# Patient Record
Sex: FEMALE | Race: WHITE | NOT HISPANIC OR LATINO
[De-identification: names, ages, dates, MRNs, and addresses within clinical notes are randomized per-mention and may not be internally consistent; named-entity substitution may affect disease eponyms.]

---

## 2018-07-18 ENCOUNTER — HOSPITAL (OUTPATIENT)
Dept: OTHER | Age: 33
End: 2018-07-18
Attending: PHYSICIAN ASSISTANT

## 2018-08-04 ENCOUNTER — HOSPITAL (OUTPATIENT)
Dept: OTHER | Age: 33
End: 2018-08-04
Attending: PHYSICIAN ASSISTANT

## 2018-08-23 ENCOUNTER — LAB SERVICES (OUTPATIENT)
Dept: OTHER | Age: 33
End: 2018-08-23

## 2018-08-23 ENCOUNTER — CHARTING TRANS (OUTPATIENT)
Dept: OTHER | Age: 33
End: 2018-08-23

## 2018-09-12 ENCOUNTER — HOSPITAL (OUTPATIENT)
Dept: OTHER | Age: 33
End: 2018-09-12
Attending: FAMILY MEDICINE

## 2018-09-21 ENCOUNTER — CHARTING TRANS (OUTPATIENT)
Dept: OTHER | Age: 33
End: 2018-09-21

## 2018-09-24 LAB
T3FREE SERPL-MCNC: >20 PG/ML (ref 2.2–4)
T4 FREE SERPL-MCNC: >8 NG/DL (ref 0.8–1.5)
THYROGLOB AB SERPL-ACNC: <0.9 IU/ML (ref 0–4)
THYROPEROXIDASE AB SERPL-ACNC: 4924 UNITS/ML
TSH SERPL-ACNC: <0.008 MCUNITS/ML (ref 0.35–5)

## 2018-09-27 ENCOUNTER — HOSPITAL (OUTPATIENT)
Dept: OTHER | Age: 33
End: 2018-09-27
Attending: FAMILY MEDICINE

## 2018-10-08 ENCOUNTER — HOSPITAL (OUTPATIENT)
Dept: OTHER | Age: 33
End: 2018-10-08
Attending: EMERGENCY MEDICINE

## 2018-10-23 ENCOUNTER — HOSPITAL (OUTPATIENT)
Dept: OTHER | Age: 33
End: 2018-10-23
Attending: PHYSICIAN ASSISTANT

## 2018-10-23 LAB
CONTROL LINE: PRESENT
INFLU A POC: NORMAL
INFLU B POC: NORMAL
Lab: CLEAR

## 2018-12-08 VITALS
BODY MASS INDEX: 29.64 KG/M2 | SYSTOLIC BLOOD PRESSURE: 110 MMHG | HEART RATE: 78 BPM | WEIGHT: 161.06 LBS | TEMPERATURE: 98.3 F | DIASTOLIC BLOOD PRESSURE: 62 MMHG | HEIGHT: 62 IN | RESPIRATION RATE: 16 BRPM

## 2018-12-08 VITALS
TEMPERATURE: 97.6 F | DIASTOLIC BLOOD PRESSURE: 80 MMHG | RESPIRATION RATE: 16 BRPM | WEIGHT: 163 LBS | BODY MASS INDEX: 30 KG/M2 | SYSTOLIC BLOOD PRESSURE: 110 MMHG | HEIGHT: 62 IN | HEART RATE: 72 BPM

## 2019-01-02 RX ORDER — ALBUTEROL SULFATE 90 UG/1
2 AEROSOL, METERED RESPIRATORY (INHALATION) EVERY 4 HOURS PRN
COMMUNITY

## 2019-01-02 RX ORDER — METHIMAZOLE 10 MG/1
10 TABLET ORAL DAILY
COMMUNITY
End: 2023-04-12 | Stop reason: ALTCHOICE

## 2019-01-02 RX ORDER — PROPRANOLOL HYDROCHLORIDE 20 MG/1
20 TABLET ORAL 2 TIMES DAILY
COMMUNITY
End: 2023-04-12 | Stop reason: ALTCHOICE

## 2019-01-02 RX ORDER — CLONAZEPAM 0.5 MG/1
0.5 TABLET ORAL 3 TIMES DAILY PRN
COMMUNITY
End: 2023-04-12 | Stop reason: ALTCHOICE

## 2019-01-08 ENCOUNTER — TELEPHONE (OUTPATIENT)
Dept: OBGYN | Age: 34
End: 2019-01-08

## 2019-01-10 ENCOUNTER — OFFICE VISIT (OUTPATIENT)
Dept: FAMILY MEDICINE | Age: 34
End: 2019-01-10

## 2019-01-10 VITALS
DIASTOLIC BLOOD PRESSURE: 66 MMHG | HEIGHT: 62 IN | HEART RATE: 64 BPM | WEIGHT: 163 LBS | RESPIRATION RATE: 18 BRPM | BODY MASS INDEX: 30 KG/M2 | SYSTOLIC BLOOD PRESSURE: 122 MMHG

## 2019-01-10 DIAGNOSIS — J06.9 VIRAL URI: ICD-10-CM

## 2019-01-10 DIAGNOSIS — Z72.0 TOBACCO ABUSE: ICD-10-CM

## 2019-01-10 DIAGNOSIS — O09.91 HIGH-RISK PREGNANCY IN FIRST TRIMESTER: Primary | ICD-10-CM

## 2019-01-10 DIAGNOSIS — E07.9 THYROID DISEASE: ICD-10-CM

## 2019-01-10 PROBLEM — E05.00 GRAVES DISEASE: Status: ACTIVE | Noted: 2019-01-10

## 2019-01-10 PROBLEM — T78.40XA ALLERGY: Status: ACTIVE | Noted: 2019-01-10

## 2019-01-10 PROBLEM — K08.409 WISDOM TEETH EXTRACTED: Status: RESOLVED | Noted: 2019-01-10 | Resolved: 2019-01-10

## 2019-01-10 PROCEDURE — 99204 OFFICE O/P NEW MOD 45 MIN: CPT | Performed by: NURSE PRACTITIONER

## 2019-01-10 RX ORDER — PROPYLTHIOURACIL 50 MG/1
50 TABLET ORAL 3 TIMES DAILY
COMMUNITY
End: 2023-04-12 | Stop reason: ALTCHOICE

## 2019-01-10 ASSESSMENT — ENCOUNTER SYMPTOMS
CHILLS: 0
ALLERGIC/IMMUNOLOGIC NEGATIVE: 1
PSYCHIATRIC NEGATIVE: 1
EYES NEGATIVE: 1
HEMATOLOGIC/LYMPHATIC NEGATIVE: 1
NEUROLOGICAL NEGATIVE: 1
GASTROINTESTINAL NEGATIVE: 1
ENDOCRINE NEGATIVE: 1
EYE DISCHARGE: 0
RESPIRATORY NEGATIVE: 1
RHINORRHEA: 1

## 2019-01-17 ENCOUNTER — TELEPHONE (OUTPATIENT)
Dept: OBGYN | Age: 34
End: 2019-01-17

## 2019-01-28 ENCOUNTER — HOSPITAL (OUTPATIENT)
Dept: OTHER | Age: 34
End: 2019-01-28
Attending: NURSE PRACTITIONER

## 2019-02-04 ENCOUNTER — TELEPHONE (OUTPATIENT)
Dept: OBGYN CLINIC | Facility: CLINIC | Age: 34
End: 2019-02-04

## 2019-02-04 NOTE — TELEPHONE ENCOUNTER
New pt. Confirms LMP 11/23/18 and positive HTP. 10w3d confirms regular cycles every 28 days. Confirms taking PNV with DHA and FA.  States has a hx of \"severe Graves disease\" and endocrinologist changed her meds from [facility] to now PTU 100mg BID d/t pr

## 2019-02-04 NOTE — TELEPHONE ENCOUNTER
LMP 11/23.  PT SEEING HIGH RISK RIGHT NOW DUE TO THYROID ,  PT IS 10 WEEKS AND NEEDS TO NOW SEE A REGULAR OBGYN   PT HAS [employer] PPO

## 2019-02-19 ENCOUNTER — NURSE ONLY (OUTPATIENT)
Dept: OBGYN CLINIC | Facility: CLINIC | Age: 34
End: 2019-02-19
Payer: COMMERCIAL

## 2019-02-19 ENCOUNTER — TELEPHONE (OUTPATIENT)
Dept: OBGYN CLINIC | Facility: CLINIC | Age: 34
End: 2019-02-19

## 2019-02-19 ENCOUNTER — LAB ENCOUNTER (OUTPATIENT)
Dept: LAB | Facility: HOSPITAL | Age: 34
End: 2019-02-19
Attending: OBSTETRICS & GYNECOLOGY
Payer: COMMERCIAL

## 2019-02-19 VITALS — BODY MASS INDEX: 29.1 KG/M2 | WEIGHT: 162.19 LBS | HEIGHT: 62.5 IN

## 2019-02-19 DIAGNOSIS — Z34.81 ENCOUNTER FOR SUPERVISION OF OTHER NORMAL PREGNANCY IN FIRST TRIMESTER: ICD-10-CM

## 2019-02-19 DIAGNOSIS — Z34.92 ENCOUNTER FOR SUPERVISION OF NORMAL PREGNANCY IN SECOND TRIMESTER, UNSPECIFIED GRAVIDITY: Primary | ICD-10-CM

## 2019-02-19 DIAGNOSIS — Z34.81 ENCOUNTER FOR SUPERVISION OF OTHER NORMAL PREGNANCY IN FIRST TRIMESTER: Primary | ICD-10-CM

## 2019-02-19 LAB
ANTIBODY SCREEN: NEGATIVE
BASOPHILS # BLD AUTO: 0.05 X10(3) UL (ref 0–0.2)
BASOPHILS NFR BLD AUTO: 0.6 %
CONTROL LINE PRESENT WITH A CLEAR BACKGROUND (YES/NO): YES YES/NO
DEPRECATED RDW RBC AUTO: 43.2 FL (ref 35.1–46.3)
EOSINOPHIL # BLD AUTO: 0.26 X10(3) UL (ref 0–0.7)
EOSINOPHIL NFR BLD AUTO: 3.3 %
ERYTHROCYTE [DISTWIDTH] IN BLOOD BY AUTOMATED COUNT: 13.5 % (ref 11–15)
HCT VFR BLD AUTO: 39.5 % (ref 35–48)
HGB BLD-MCNC: 13.3 G/DL (ref 12–16)
IMM GRANULOCYTES # BLD AUTO: 0.01 X10(3) UL (ref 0–1)
IMM GRANULOCYTES NFR BLD: 0.1 %
KIT LOT #: NORMAL NUMERIC
LYMPHOCYTES # BLD AUTO: 3.12 X10(3) UL (ref 1–4)
LYMPHOCYTES NFR BLD AUTO: 39.5 %
MCH RBC QN AUTO: 29.3 PG (ref 26–34)
MCHC RBC AUTO-ENTMCNC: 33.7 G/DL (ref 31–37)
MCV RBC AUTO: 87 FL (ref 80–100)
MONOCYTES # BLD AUTO: 0.51 X10(3) UL (ref 0.1–1)
MONOCYTES NFR BLD AUTO: 6.5 %
NEUTROPHILS # BLD AUTO: 3.94 X10 (3) UL (ref 1.5–7.7)
NEUTROPHILS # BLD AUTO: 3.94 X10(3) UL (ref 1.5–7.7)
NEUTROPHILS NFR BLD AUTO: 50 %
PLATELET # BLD AUTO: 219 10(3)UL (ref 150–450)
PREGNANCY TEST, URINE: POSITIVE
RBC # BLD AUTO: 4.54 X10(6)UL (ref 3.8–5.3)
RH BLOOD TYPE: POSITIVE
WBC # BLD AUTO: 7.9 X10(3) UL (ref 4–11)

## 2019-02-19 PROCEDURE — 87340 HEPATITIS B SURFACE AG IA: CPT

## 2019-02-19 PROCEDURE — 87086 URINE CULTURE/COLONY COUNT: CPT

## 2019-02-19 PROCEDURE — 86901 BLOOD TYPING SEROLOGIC RH(D): CPT

## 2019-02-19 PROCEDURE — 86780 TREPONEMA PALLIDUM: CPT

## 2019-02-19 PROCEDURE — 86850 RBC ANTIBODY SCREEN: CPT

## 2019-02-19 PROCEDURE — 86900 BLOOD TYPING SEROLOGIC ABO: CPT

## 2019-02-19 PROCEDURE — 86762 RUBELLA ANTIBODY: CPT

## 2019-02-19 PROCEDURE — 81025 URINE PREGNANCY TEST: CPT | Performed by: OBSTETRICS & GYNECOLOGY

## 2019-02-19 PROCEDURE — 86803 HEPATITIS C AB TEST: CPT

## 2019-02-19 PROCEDURE — 87389 HIV-1 AG W/HIV-1&-2 AB AG IA: CPT

## 2019-02-19 PROCEDURE — 85025 COMPLETE CBC W/AUTO DIFF WBC: CPT

## 2019-02-19 PROCEDURE — 36415 COLL VENOUS BLD VENIPUNCTURE: CPT

## 2019-02-19 RX ORDER — PROPYLTHIOURACIL 50 MG/1
TABLET ORAL 3 TIMES DAILY
COMMUNITY

## 2019-02-19 NOTE — PROGRESS NOTES
Pt seen for OBN appt today with no complaints. Normal PN labs ordered plus hep c. Pt advised all labs must be completed and resulted prior to MD appt.   Pt walked to  to schedule NPN appt with MD.    Sakshi Pollard name is Lucia Richards  contact # P are you a Mandaen? Yes-pt stated she would accept blood products                 INFECTION HISTORY    Chlamydia Yes-at age 27. Treated.      Pt or partner have hx of Genital Herpes No    Gonorrhea No    Hepatitis B No    HIV No    HPV Yes    MRSA

## 2019-02-20 LAB
HBV SURFACE AG SER-ACNC: <0.1 [IU]/L
HBV SURFACE AG SERPL QL IA: NONREACTIVE
HCV AB SERPL QL IA: NONREACTIVE
RUBV IGG SER QL: POSITIVE
RUBV IGG SER-ACNC: 211.3 IU/ML (ref 10–?)
T PALLIDUM AB SER QL: NEGATIVE

## 2019-02-25 NOTE — TELEPHONE ENCOUNTER
Please let pt know the order for fts was entered and she can call [phone] to schedule an appointment asap.

## 2019-03-01 ENCOUNTER — HOSPITAL (OUTPATIENT)
Dept: OTHER | Age: 34
End: 2019-03-01
Attending: NURSE PRACTITIONER

## 2019-03-04 ENCOUNTER — TELEPHONE (OUTPATIENT)
Dept: OBGYN CLINIC | Facility: CLINIC | Age: 34
End: 2019-03-04

## 2019-03-04 NOTE — TELEPHONE ENCOUNTER
OUTSIDE RECORDS (2 M ULTRASOUNDS) [address] [place] [address] TO CHART PREP FOR APPT ON 3-6-19.

## 2019-03-06 ENCOUNTER — TELEPHONE (OUTPATIENT)
Dept: OBGYN CLINIC | Facility: CLINIC | Age: 34
End: 2019-03-06

## 2019-03-06 ENCOUNTER — INITIAL PRENATAL (OUTPATIENT)
Dept: OBGYN CLINIC | Facility: CLINIC | Age: 34
End: 2019-03-06
Payer: COMMERCIAL

## 2019-03-06 VITALS
SYSTOLIC BLOOD PRESSURE: 104 MMHG | HEART RATE: 67 BPM | DIASTOLIC BLOOD PRESSURE: 64 MMHG | BODY MASS INDEX: 31 KG/M2 | WEIGHT: 170 LBS

## 2019-03-06 DIAGNOSIS — Z34.92 ENCOUNTER FOR SUPERVISION OF NORMAL PREGNANCY IN SECOND TRIMESTER, UNSPECIFIED GRAVIDITY: Primary | ICD-10-CM

## 2019-03-06 LAB
MULTISTIX LOT#: NORMAL NUMERIC
PH, URINE: 7 (ref 4.5–8)
SPECIFIC GRAVITY: 1.01 (ref 1–1.03)

## 2019-03-06 PROCEDURE — 81002 URINALYSIS NONAUTO W/O SCOPE: CPT | Performed by: OBSTETRICS & GYNECOLOGY

## 2019-03-06 PROCEDURE — 99213 OFFICE O/P EST LOW 20 MIN: CPT | Performed by: OBSTETRICS & GYNECOLOGY

## 2019-03-06 NOTE — PROGRESS NOTES
Natasha Rendon is a 34year old female [id number] Patient's last menstrual period was 11/23/2018 (exact date). Patient presents with:  Prenatal Care: NPN      Patient presents today to possibly establish prenatal care.  Pt has been seeing MFM in [facility] file        Inability: Not on file      Transportation needs:        Medical: Not on file        Non-medical: Not on file    Tobacco Use      Smoking status: Current Every Day Smoker        Packs/day: 1.50        Years: 16.00        Pack years: 24      Smo times daily as needed  anxiety, Disp: 30 tablet, Rfl: 0  •  TRI-SPRINTEC 0.18/0.215/0.25 MG-35 MCG Oral Tab, TAKE ONE TABLET BY MOUTH ONE TIME DAILY, Disp: 28 tablet, Rfl: 11  •  fluconazole (DIFLUCAN) 100 MG Oral Tab, Take 1 tablet every 3 days for 3 dose may not get care with us. Reviewed need to keep take medication as prescribed, stay hydrated and take PNV.

## 2019-03-06 NOTE — TELEPHONE ENCOUNTER
Called patient to review plan of group. Discussed case with partners and we feel it is best pt continue care with current MFM and in [facility] system. 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 Reviewed this is for her health and health of her unborn child.

## 2019-03-12 ENCOUNTER — HOSPITAL (OUTPATIENT)
Dept: OTHER | Age: 34
End: 2019-03-12
Attending: NURSE PRACTITIONER

## 2019-03-12 LAB
CHLAMYDIA PROBE: NEGATIVE
CONTROL LINE: PRESENT
CONTROL LINE: PRESENT
GC PROBE: NEGATIVE
Lab: CLEAR
Lab: CLEAR
N GON SOURCE: NORMAL
TRICH POC: NORMAL
UA APPEAR POC: ABNORMAL
UA BILI POC: NEGATIVE
UA BLOOD POC: NEGATIVE
UA COLOR POC: YELLOW
UA GLUCOSE POC: NEGATIVE
UA KETONES POC: ABNORMAL
UA LEUK EST POC: NEGATIVE
UA NITRITE POC: NEGATIVE
UA PH POC: 6.5 (ref 5–7)
UA PROTEIN POC: NEGATIVE
UA SPEC GRAV POC: 1.01 (ref 1.01–1.02)
UA UROBILIN POC: 0.2 MG/DL
URINE PREG POC: POSITIVE

## 2019-03-13 ENCOUNTER — TELEPHONE (OUTPATIENT)
Dept: OBGYN CLINIC | Facility: CLINIC | Age: 34
End: 2019-03-13

## 2019-03-13 NOTE — TELEPHONE ENCOUNTER
Pt would like a copy of her blood work results.  Please advise can  at [facility]

## 2019-03-18 NOTE — TELEPHONE ENCOUNTER
Provided pt with PN lab results. Labs printed and placed at [facility]  for pt p/u as requested.

## 2023-04-12 ENCOUNTER — BEHAVIORAL HEALTH (OUTPATIENT)
Dept: BEHAVIORAL HEALTH | Age: 38
End: 2023-04-12

## 2023-04-12 DIAGNOSIS — F41.9 ANXIETY: Primary | ICD-10-CM

## 2023-04-12 PROBLEM — E89.0 POSTOPERATIVE HYPOTHYROIDISM: Status: ACTIVE | Noted: 2020-02-24

## 2023-04-12 PROCEDURE — 90792 PSYCH DIAG EVAL W/MED SRVCS: CPT | Performed by: PSYCHIATRY & NEUROLOGY

## 2023-04-12 RX ORDER — FLUTICASONE PROPIONATE AND SALMETEROL 500; 50 UG/1; UG/1
1 POWDER RESPIRATORY (INHALATION) 2 TIMES DAILY
COMMUNITY

## 2023-04-12 RX ORDER — LEVOTHYROXINE SODIUM 0.15 MG/1
150 TABLET ORAL DAILY
COMMUNITY
Start: 2023-03-25

## 2023-04-12 RX ORDER — FLUTICASONE PROPIONATE 50 MCG
SPRAY, SUSPENSION (ML) NASAL
COMMUNITY

## 2023-04-12 RX ORDER — MONTELUKAST SODIUM 10 MG/1
10 TABLET ORAL DAILY
COMMUNITY
Start: 2023-03-02

## 2023-04-26 ENCOUNTER — BEHAVIORAL HEALTH (OUTPATIENT)
Dept: BEHAVIORAL HEALTH | Age: 38
End: 2023-04-26

## 2023-04-26 DIAGNOSIS — F43.10 PTSD (POST-TRAUMATIC STRESS DISORDER): ICD-10-CM

## 2023-04-26 DIAGNOSIS — F41.9 ANXIETY: Primary | ICD-10-CM

## 2023-04-26 PROCEDURE — 99214 OFFICE O/P EST MOD 30 MIN: CPT | Performed by: PSYCHIATRY & NEUROLOGY

## 2023-04-26 RX ORDER — BUSPIRONE HYDROCHLORIDE 10 MG/1
10 TABLET ORAL 2 TIMES DAILY
Qty: 60 TABLET | Refills: 0 | Status: SHIPPED | OUTPATIENT
Start: 2023-04-26 | End: 2023-05-24 | Stop reason: SDUPTHER

## 2023-04-26 RX ORDER — PRAZOSIN HYDROCHLORIDE 1 MG/1
1 CAPSULE ORAL NIGHTLY
Qty: 30 CAPSULE | Refills: 0 | Status: SHIPPED | OUTPATIENT
Start: 2023-04-26 | End: 2023-05-24 | Stop reason: SDUPTHER

## 2023-05-24 ENCOUNTER — BEHAVIORAL HEALTH (OUTPATIENT)
Dept: BEHAVIORAL HEALTH | Age: 38
End: 2023-05-24

## 2023-05-24 DIAGNOSIS — F43.10 PTSD (POST-TRAUMATIC STRESS DISORDER): ICD-10-CM

## 2023-05-24 DIAGNOSIS — F41.9 ANXIETY: Primary | ICD-10-CM

## 2023-05-24 PROCEDURE — 99214 OFFICE O/P EST MOD 30 MIN: CPT | Performed by: PSYCHIATRY & NEUROLOGY

## 2023-05-24 RX ORDER — PRAZOSIN HYDROCHLORIDE 2 MG/1
2 CAPSULE ORAL NIGHTLY
Qty: 30 CAPSULE | Refills: 0 | Status: SHIPPED | OUTPATIENT
Start: 2023-05-24 | End: 2023-05-24 | Stop reason: SDUPTHER

## 2023-05-24 RX ORDER — BUSPIRONE HYDROCHLORIDE 15 MG/1
15 TABLET ORAL 2 TIMES DAILY
Qty: 60 TABLET | Refills: 0 | Status: SHIPPED | OUTPATIENT
Start: 2023-05-24 | End: 2023-06-20 | Stop reason: SDUPTHER

## 2023-05-24 RX ORDER — BUSPIRONE HYDROCHLORIDE 15 MG/1
15 TABLET ORAL 2 TIMES DAILY
Qty: 60 TABLET | Refills: 0 | Status: SHIPPED | OUTPATIENT
Start: 2023-05-24 | End: 2023-05-24 | Stop reason: SDUPTHER

## 2023-05-24 RX ORDER — PRAZOSIN HYDROCHLORIDE 2 MG/1
2 CAPSULE ORAL NIGHTLY
Qty: 30 CAPSULE | Refills: 0 | Status: SHIPPED | OUTPATIENT
Start: 2023-05-24 | End: 2023-06-20 | Stop reason: SDUPTHER

## 2023-06-20 ENCOUNTER — BEHAVIORAL HEALTH (OUTPATIENT)
Dept: BEHAVIORAL HEALTH | Age: 38
End: 2023-06-20

## 2023-06-20 DIAGNOSIS — F43.10 PTSD (POST-TRAUMATIC STRESS DISORDER): ICD-10-CM

## 2023-06-20 DIAGNOSIS — F41.9 ANXIETY: Primary | ICD-10-CM

## 2023-06-20 PROCEDURE — 99214 OFFICE O/P EST MOD 30 MIN: CPT | Performed by: PSYCHIATRY & NEUROLOGY

## 2023-06-20 RX ORDER — BUSPIRONE HYDROCHLORIDE 15 MG/1
15 TABLET ORAL 3 TIMES DAILY
Qty: 90 TABLET | Refills: 0 | Status: SHIPPED | OUTPATIENT
Start: 2023-06-20 | End: 2023-07-20 | Stop reason: SDUPTHER

## 2023-06-20 RX ORDER — PRAZOSIN HYDROCHLORIDE 2 MG/1
2 CAPSULE ORAL NIGHTLY
Qty: 30 CAPSULE | Refills: 0 | Status: SHIPPED | OUTPATIENT
Start: 2023-06-20 | End: 2023-07-17

## 2023-07-17 DIAGNOSIS — F43.10 PTSD (POST-TRAUMATIC STRESS DISORDER): ICD-10-CM

## 2023-07-17 RX ORDER — PRAZOSIN HYDROCHLORIDE 2 MG/1
CAPSULE ORAL
Qty: 30 CAPSULE | Refills: 0 | Status: SHIPPED | OUTPATIENT
Start: 2023-07-20 | End: 2023-07-20 | Stop reason: SDUPTHER

## 2023-07-20 ENCOUNTER — BEHAVIORAL HEALTH (OUTPATIENT)
Dept: BEHAVIORAL HEALTH | Age: 38
End: 2023-07-20

## 2023-07-20 DIAGNOSIS — F41.9 ANXIETY: Primary | ICD-10-CM

## 2023-07-20 DIAGNOSIS — F43.10 PTSD (POST-TRAUMATIC STRESS DISORDER): ICD-10-CM

## 2023-07-20 PROCEDURE — 99214 OFFICE O/P EST MOD 30 MIN: CPT | Performed by: PSYCHIATRY & NEUROLOGY

## 2023-07-20 RX ORDER — PRAZOSIN HYDROCHLORIDE 2 MG/1
4 CAPSULE ORAL NIGHTLY
Qty: 60 CAPSULE | Refills: 0 | Status: SHIPPED | OUTPATIENT
Start: 2023-07-20 | End: 2023-08-17 | Stop reason: SDUPTHER

## 2023-07-20 RX ORDER — BUSPIRONE HYDROCHLORIDE 15 MG/1
15 TABLET ORAL 3 TIMES DAILY
Qty: 90 TABLET | Refills: 0 | Status: SHIPPED | OUTPATIENT
Start: 2023-07-20 | End: 2023-08-17 | Stop reason: SDUPTHER

## 2023-07-28 ENCOUNTER — TELEPHONE (OUTPATIENT)
Dept: BEHAVIORAL HEALTH | Age: 38
End: 2023-07-28

## 2023-08-17 ENCOUNTER — BEHAVIORAL HEALTH (OUTPATIENT)
Dept: BEHAVIORAL HEALTH | Age: 38
End: 2023-08-17

## 2023-08-17 DIAGNOSIS — F43.10 PTSD (POST-TRAUMATIC STRESS DISORDER): ICD-10-CM

## 2023-08-17 DIAGNOSIS — F41.9 ANXIETY: Primary | ICD-10-CM

## 2023-08-17 PROCEDURE — 99214 OFFICE O/P EST MOD 30 MIN: CPT | Performed by: PSYCHIATRY & NEUROLOGY

## 2023-08-17 RX ORDER — TRAZODONE HYDROCHLORIDE 50 MG/1
50-100 TABLET ORAL NIGHTLY PRN
Qty: 60 TABLET | Refills: 0 | Status: SHIPPED | OUTPATIENT
Start: 2023-08-17 | End: 2023-09-14 | Stop reason: SDUPTHER

## 2023-08-17 RX ORDER — BUSPIRONE HYDROCHLORIDE 15 MG/1
15 TABLET ORAL 3 TIMES DAILY
Qty: 90 TABLET | Refills: 0 | Status: SHIPPED | OUTPATIENT
Start: 2023-08-17 | End: 2023-09-14 | Stop reason: SDUPTHER

## 2023-08-17 RX ORDER — PRAZOSIN HYDROCHLORIDE 2 MG/1
2 CAPSULE ORAL NIGHTLY
Qty: 30 CAPSULE | Refills: 0 | Status: SHIPPED | OUTPATIENT
Start: 2023-08-17 | End: 2023-09-14 | Stop reason: SDUPTHER

## 2023-09-12 ENCOUNTER — TELEPHONE (OUTPATIENT)
Dept: BEHAVIORAL HEALTH | Age: 38
End: 2023-09-12

## 2023-09-12 RX ORDER — HYDROXYZINE HYDROCHLORIDE 25 MG/1
25 TABLET, FILM COATED ORAL 3 TIMES DAILY PRN
Qty: 90 TABLET | Refills: 0 | Status: SHIPPED | OUTPATIENT
Start: 2023-09-12 | End: 2023-09-14 | Stop reason: SDUPTHER

## 2023-09-12 RX ORDER — HYDROXYZINE HYDROCHLORIDE 25 MG/1
25 TABLET, FILM COATED ORAL 3 TIMES DAILY PRN
COMMUNITY
End: 2023-09-12 | Stop reason: SDUPTHER

## 2023-09-14 ENCOUNTER — BEHAVIORAL HEALTH (OUTPATIENT)
Dept: BEHAVIORAL HEALTH | Age: 38
End: 2023-09-14

## 2023-09-14 DIAGNOSIS — F41.9 ANXIETY: Primary | ICD-10-CM

## 2023-09-14 DIAGNOSIS — F43.10 PTSD (POST-TRAUMATIC STRESS DISORDER): ICD-10-CM

## 2023-09-14 PROCEDURE — 99214 OFFICE O/P EST MOD 30 MIN: CPT | Performed by: PSYCHIATRY & NEUROLOGY

## 2023-09-14 RX ORDER — TRAZODONE HYDROCHLORIDE 50 MG/1
50-100 TABLET ORAL NIGHTLY PRN
Qty: 60 TABLET | Refills: 0 | Status: SHIPPED | OUTPATIENT
Start: 2023-09-14 | End: 2023-10-04 | Stop reason: SDUPTHER

## 2023-09-14 RX ORDER — PRAZOSIN HYDROCHLORIDE 2 MG/1
2 CAPSULE ORAL NIGHTLY
Qty: 30 CAPSULE | Refills: 0 | Status: SHIPPED | OUTPATIENT
Start: 2023-09-14 | End: 2023-10-04 | Stop reason: SDUPTHER

## 2023-09-14 RX ORDER — BUSPIRONE HYDROCHLORIDE 15 MG/1
15 TABLET ORAL 3 TIMES DAILY
Qty: 90 TABLET | Refills: 0 | Status: SHIPPED | OUTPATIENT
Start: 2023-09-14 | End: 2023-10-04 | Stop reason: SDUPTHER

## 2023-09-14 RX ORDER — HYDROXYZINE HYDROCHLORIDE 25 MG/1
25 TABLET, FILM COATED ORAL 3 TIMES DAILY PRN
Qty: 90 TABLET | Refills: 0 | Status: SHIPPED | OUTPATIENT
Start: 2023-09-14 | End: 2023-10-04 | Stop reason: SDUPTHER

## 2023-09-27 ENCOUNTER — APPOINTMENT (OUTPATIENT)
Dept: BEHAVIORAL HEALTH | Age: 38
End: 2023-09-27

## 2023-10-04 ENCOUNTER — BEHAVIORAL HEALTH (OUTPATIENT)
Dept: BEHAVIORAL HEALTH | Age: 38
End: 2023-10-04

## 2023-10-04 DIAGNOSIS — F41.9 ANXIETY: Primary | ICD-10-CM

## 2023-10-04 DIAGNOSIS — F43.10 PTSD (POST-TRAUMATIC STRESS DISORDER): ICD-10-CM

## 2023-10-04 PROCEDURE — 99214 OFFICE O/P EST MOD 30 MIN: CPT | Performed by: PSYCHIATRY & NEUROLOGY

## 2023-10-04 RX ORDER — BUSPIRONE HYDROCHLORIDE 15 MG/1
15 TABLET ORAL 3 TIMES DAILY
Qty: 90 TABLET | Refills: 0 | Status: SHIPPED | OUTPATIENT
Start: 2023-10-04

## 2023-10-04 RX ORDER — TRAZODONE HYDROCHLORIDE 50 MG/1
50-100 TABLET ORAL NIGHTLY PRN
Qty: 60 TABLET | Refills: 0 | Status: SHIPPED | OUTPATIENT
Start: 2023-10-04

## 2023-10-04 RX ORDER — HYDROXYZINE HYDROCHLORIDE 25 MG/1
25 TABLET, FILM COATED ORAL 3 TIMES DAILY PRN
Qty: 90 TABLET | Refills: 0 | Status: SHIPPED | OUTPATIENT
Start: 2023-10-04

## 2023-10-04 RX ORDER — PRAZOSIN HYDROCHLORIDE 2 MG/1
2 CAPSULE ORAL NIGHTLY
Qty: 30 CAPSULE | Refills: 0 | Status: SHIPPED | OUTPATIENT
Start: 2023-10-04

## 2023-11-29 ENCOUNTER — APPOINTMENT (OUTPATIENT)
Dept: BEHAVIORAL HEALTH | Age: 38
End: 2023-11-29

## 2023-11-29 DIAGNOSIS — F43.10 PTSD (POST-TRAUMATIC STRESS DISORDER): ICD-10-CM

## 2023-11-29 DIAGNOSIS — F41.9 ANXIETY: Primary | ICD-10-CM

## 2023-11-29 PROCEDURE — 99214 OFFICE O/P EST MOD 30 MIN: CPT | Performed by: PSYCHIATRY & NEUROLOGY

## 2023-11-29 RX ORDER — PRAZOSIN HYDROCHLORIDE 2 MG/1
2 CAPSULE ORAL NIGHTLY
Qty: 30 CAPSULE | Refills: 0 | Status: SHIPPED | OUTPATIENT
Start: 2023-11-29

## 2023-11-29 RX ORDER — TRAZODONE HYDROCHLORIDE 50 MG/1
50-100 TABLET ORAL NIGHTLY PRN
Qty: 60 TABLET | Refills: 0 | Status: SHIPPED | OUTPATIENT
Start: 2023-11-29

## 2023-11-29 RX ORDER — BUSPIRONE HYDROCHLORIDE 15 MG/1
15 TABLET ORAL 3 TIMES DAILY
Qty: 90 TABLET | Refills: 0 | Status: SHIPPED | OUTPATIENT
Start: 2023-11-29

## 2023-11-29 RX ORDER — HYDROXYZINE HYDROCHLORIDE 25 MG/1
25 TABLET, FILM COATED ORAL 3 TIMES DAILY PRN
Qty: 90 TABLET | Refills: 0 | Status: SHIPPED | OUTPATIENT
Start: 2023-11-29

## 2024-01-05 DIAGNOSIS — F43.10 PTSD (POST-TRAUMATIC STRESS DISORDER): ICD-10-CM

## 2024-01-05 RX ORDER — PRAZOSIN HYDROCHLORIDE 2 MG/1
2 CAPSULE ORAL NIGHTLY
Qty: 30 CAPSULE | Refills: 0 | OUTPATIENT
Start: 2024-01-05

## 2024-08-12 ENCOUNTER — LAB ENCOUNTER (OUTPATIENT)
Dept: LAB | Age: 39
End: 2024-08-12
Attending: INTERNAL MEDICINE
Payer: MEDICAID

## 2024-08-12 ENCOUNTER — OFFICE VISIT (OUTPATIENT)
Facility: CLINIC | Age: 39
End: 2024-08-12
Payer: MEDICAID

## 2024-08-12 VITALS
HEIGHT: 62 IN | OXYGEN SATURATION: 97 % | WEIGHT: 175 LBS | RESPIRATION RATE: 16 BRPM | SYSTOLIC BLOOD PRESSURE: 102 MMHG | DIASTOLIC BLOOD PRESSURE: 54 MMHG | BODY MASS INDEX: 32.2 KG/M2 | HEART RATE: 47 BPM

## 2024-08-12 DIAGNOSIS — J45.50 SEVERE PERSISTENT ASTHMA WITHOUT COMPLICATION (HCC): ICD-10-CM

## 2024-08-12 DIAGNOSIS — J30.9 ALLERGIC RHINITIS, UNSPECIFIED SEASONALITY, UNSPECIFIED TRIGGER: ICD-10-CM

## 2024-08-12 DIAGNOSIS — R06.02 SHORTNESS OF BREATH: Primary | ICD-10-CM

## 2024-08-12 DIAGNOSIS — R06.02 SHORTNESS OF BREATH: ICD-10-CM

## 2024-08-12 LAB
BASOPHILS # BLD AUTO: 0.09 X10(3) UL (ref 0–0.2)
BASOPHILS NFR BLD AUTO: 1.4 %
EOSINOPHIL # BLD AUTO: 0.22 X10(3) UL (ref 0–0.7)
EOSINOPHIL NFR BLD AUTO: 3.4 %
ERYTHROCYTE [DISTWIDTH] IN BLOOD BY AUTOMATED COUNT: 13.1 %
HCT VFR BLD AUTO: 38.6 %
HGB BLD-MCNC: 13.3 G/DL
IMM GRANULOCYTES # BLD AUTO: 0.02 X10(3) UL (ref 0–1)
IMM GRANULOCYTES NFR BLD: 0.3 %
LYMPHOCYTES # BLD AUTO: 2.37 X10(3) UL (ref 1–4)
LYMPHOCYTES NFR BLD AUTO: 37.1 %
MCH RBC QN AUTO: 32.2 PG (ref 26–34)
MCHC RBC AUTO-ENTMCNC: 34.5 G/DL (ref 31–37)
MCV RBC AUTO: 93.5 FL
MONOCYTES # BLD AUTO: 0.36 X10(3) UL (ref 0.1–1)
MONOCYTES NFR BLD AUTO: 5.6 %
NEUTROPHILS # BLD AUTO: 3.33 X10 (3) UL (ref 1.5–7.7)
NEUTROPHILS # BLD AUTO: 3.33 X10(3) UL (ref 1.5–7.7)
NEUTROPHILS NFR BLD AUTO: 52.2 %
PLATELET # BLD AUTO: 220 10(3)UL (ref 150–450)
RBC # BLD AUTO: 4.13 X10(6)UL
WBC # BLD AUTO: 6.4 X10(3) UL (ref 4–11)

## 2024-08-12 PROCEDURE — 82785 ASSAY OF IGE: CPT

## 2024-08-12 PROCEDURE — 36415 COLL VENOUS BLD VENIPUNCTURE: CPT

## 2024-08-12 PROCEDURE — 86003 ALLG SPEC IGE CRUDE XTRC EA: CPT

## 2024-08-12 PROCEDURE — 85025 COMPLETE CBC W/AUTO DIFF WBC: CPT

## 2024-08-12 RX ORDER — FLUTICASONE PROPIONATE 50 MCG
1 SPRAY, SUSPENSION (ML) NASAL DAILY
COMMUNITY
Start: 2024-07-19

## 2024-08-12 RX ORDER — FLUTICASONE PROPIONATE AND SALMETEROL 50; 500 UG/1; UG/1
1 POWDER RESPIRATORY (INHALATION) 2 TIMES DAILY
COMMUNITY
Start: 2024-03-27

## 2024-08-12 RX ORDER — NORETHINDRONE ACETATE AND ETHINYL ESTRADIOL AND FERROUS FUMARATE 1.5-30(21)
1 KIT ORAL DAILY
COMMUNITY

## 2024-08-12 RX ORDER — MONTELUKAST SODIUM 10 MG/1
1 TABLET ORAL DAILY
COMMUNITY
Start: 2023-03-02

## 2024-08-12 RX ORDER — BUDESONIDE, GLYCOPYRROLATE, AND FORMOTEROL FUMARATE 160; 9; 4.8 UG/1; UG/1; UG/1
2 AEROSOL, METERED RESPIRATORY (INHALATION) 2 TIMES DAILY
Qty: 10.7 G | Refills: 5 | Status: SHIPPED | OUTPATIENT
Start: 2024-08-12

## 2024-08-12 RX ORDER — BUSPIRONE HYDROCHLORIDE 15 MG/1
TABLET ORAL
COMMUNITY
Start: 2023-11-29

## 2024-08-12 RX ORDER — LEVOTHYROXINE SODIUM 0.15 MG/1
1 TABLET ORAL DAILY
COMMUNITY
Start: 2023-03-25
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
## 2024-08-13 ENCOUNTER — TELEPHONE (OUTPATIENT)
Facility: CLINIC | Age: 39
End: 2024-08-13

## 2024-08-13 NOTE — TELEPHONE ENCOUNTER
Prior Authorization done on [facility] 160-9-4.8 mcg. Key code [id number], awaiting response from insurance.

## 2024-08-14 ENCOUNTER — TELEPHONE (OUTPATIENT)
Facility: CLINIC | Age: 39
End: 2024-08-14

## 2024-08-14 DIAGNOSIS — J45.50 SEVERE PERSISTENT ASTHMA WITHOUT COMPLICATION (HCC): Primary | ICD-10-CM

## 2024-08-14 RX ORDER — TIOTROPIUM BROMIDE INHALATION SPRAY 3.12 UG/1
2 SPRAY, METERED RESPIRATORY (INHALATION) DAILY
Qty: 1 EACH | Refills: 3 | Status: SHIPPED | OUTPATIENT
Start: 2024-08-14

## 2024-08-14 RX ORDER — BUDESONIDE AND FORMOTEROL FUMARATE DIHYDRATE 160; 4.5 UG/1; UG/1
2 AEROSOL RESPIRATORY (INHALATION) 2 TIMES DAILY
Qty: 1 EACH | Refills: 3 | Status: SHIPPED | OUTPATIENT
Start: 2024-08-14

## 2024-08-14 NOTE — TELEPHONE ENCOUNTER
Prior authorization for Breztri denied per [facility] fax received. Dr. Victoria made aware. Ordered Symbicort 160-4.5 mcg and Spiriva Respimat 2.5 mcg. Patient called, made aware Breztri prior authorization was denied. Per patient, Advair inhaler not helping with symptoms, willing to try new set of inhalers. Prescription for Symbicort and Spiriva Respimat entered and sent to patient's preferred pharmacy. Patient advised to try new inhalers and see if they help with symptoms, if not we can resubmit for an appeal for Breztri or tried different inhalers. Discussed insurance coverage regarding pulmonary function test. Patient advised to call insurance to check coverage area. Advised to obtain copy of results. Patient called to notify of new inhalers (symbicort and spiriva respimat) have been ordered by provider. Advised to rinse and spit after each use to help prevent oral thrush.

## 2024-08-15 LAB
ALLERGEN BRAZIL NUT: <0.1 KUA/L (ref ?–0.1)
ALMOND IGE QN: <0.1 KUA/L (ref ?–0.1)
CASHEW NUT IGE QN: <0.1 KUA/L (ref ?–0.1)
CLAM IGE QN: <0.1 KUA/L (ref ?–0.1)
CODFISH IGE QN: <0.1 KUA/L (ref ?–0.1)
CORN IGE QN: <0.1 KUA/L (ref ?–0.1)
COW MILK IGE QN: <0.1 KUA/L (ref ?–0.1)
EGG WHITE IGE QN: <0.1 KUA/L (ref ?–0.1)
HAZELNUT IGE QN: <0.1 KUA/L (ref ?–0.1)
IGE SERPL-ACNC: 10.9 KU/L (ref 2–214)
PEANUT IGE QN: <0.1 KUA/L (ref ?–0.1)
SALMON IGE QN: <0.1 KUA/L (ref ?–0.1)
SCALLOP IGE QN: <0.1 KUA/L (ref ?–0.1)
SESAME SEED IGE QN: <0.1 KUA/L (ref ?–0.1)
SHRIMP IGE QN: <0.1 KUA/L (ref ?–0.1)
SOYBEAN IGE QN: <0.1 KUA/L (ref ?–0.1)
WALNUT IGE QN: <0.1 KUA/L (ref ?–0.1)
WHEAT IGE QN: <0.1 KUA/L (ref ?–0.1)

## 2024-08-16 LAB

## 2024-09-03 ENCOUNTER — TELEPHONE (OUTPATIENT)
Facility: CLINIC | Age: 39
End: 2024-09-03

## 2024-09-03 NOTE — TELEPHONE ENCOUNTER
Order for PFT e-faxed to [phone] as well as manually per patient request. Patient called and made aware, fax number provided for results to be faxed. Patient advised to ask for a copy of report. Patient verbalized understanding.

## 2024-09-03 NOTE — TELEPHONE ENCOUNTER
Dr. Victoria ordered PFT test, due to pt 's insurance order need to be sent to [facility]. Fax [phone] pt f/up is on 9/25/24

## 2024-10-07 ENCOUNTER — OFFICE VISIT (OUTPATIENT)
Age: 39
End: 2024-10-07
Payer: MEDICAID

## 2024-10-07 VITALS
SYSTOLIC BLOOD PRESSURE: 108 MMHG | DIASTOLIC BLOOD PRESSURE: 74 MMHG | HEIGHT: 62 IN | OXYGEN SATURATION: 99 % | WEIGHT: 175 LBS | BODY MASS INDEX: 32.2 KG/M2 | HEART RATE: 73 BPM | RESPIRATION RATE: 16 BRPM

## 2024-10-07 DIAGNOSIS — J45.50 SEVERE PERSISTENT ASTHMA WITHOUT COMPLICATION (HCC): Primary | ICD-10-CM

## 2024-10-07 PROCEDURE — 99214 OFFICE O/P EST MOD 30 MIN: CPT | Performed by: INTERNAL MEDICINE

## 2024-10-07 RX ORDER — BUDESONIDE 0.5 MG/2ML
0.5 INHALANT ORAL DAILY
Qty: 120 ML | Refills: 5 | Status: SHIPPED | OUTPATIENT
Start: 2024-10-07
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
## 2024-10-10 ENCOUNTER — TELEPHONE (OUTPATIENT)
Facility: CLINIC | Age: 39
End: 2024-10-10

## 2024-10-10 NOTE — TELEPHONE ENCOUNTER
Budesonide nebs not covered by insurance.  Per Dr. Victoria, prescription for Asmanex twisthaler 110 mcg can be sent in for replacement.  Pt will check budesonide cost out of pocket and call back if not affordable.

## 2024-10-14 RX ORDER — MOMETASONE FUROATE 110 UG/1
1 INHALANT RESPIRATORY (INHALATION) DAILY
Qty: 1 EACH | Refills: 5 | Status: SHIPPED | OUTPATIENT
Start: 2024-10-14

## 2024-10-14 NOTE — TELEPHONE ENCOUNTER
Solution for nebulizer is way too expensive.  Ok to go with steroid inhaler.  Prescription for Asmanex 110 1 puff daily sent to pharmacy.

## 2024-10-24 ENCOUNTER — TELEPHONE (OUTPATIENT)
Facility: CLINIC | Age: 39
End: 2024-10-24

## 2024-10-24 NOTE — TELEPHONE ENCOUNTER
Patient notified that her Tezspire prior authorization was denied.  Patient will need to provide office notes or notes from urgent care to send it for appeal.  Patient will need to sign a form for us to do appeal.  Emailed patient the form.

## 2024-11-18 ENCOUNTER — TELEPHONE (OUTPATIENT)
Facility: CLINIC | Age: 39
End: 2024-11-18

## 2024-11-22 ENCOUNTER — TELEPHONE (OUTPATIENT)
Facility: CLINIC | Age: 39
End: 2024-11-22

## 2024-11-22 DIAGNOSIS — J45.50 SEVERE PERSISTENT ASTHMA WITHOUT COMPLICATION (HCC): ICD-10-CM

## 2024-11-22 DIAGNOSIS — J30.9 ALLERGIC RHINITIS, UNSPECIFIED SEASONALITY, UNSPECIFIED TRIGGER: Primary | ICD-10-CM

## 2024-11-22 DIAGNOSIS — R06.02 SHORTNESS OF BREATH: ICD-10-CM

## 2024-11-22 NOTE — TELEPHONE ENCOUNTER
Returned patient's call.  She was seen in urgent care last week.  Was dx with and asthma exacerbation and bacterial infection.  Was given prednisone 40 x5, and a Zpak.  Antibiotics finished on Wednesday.  She has 1 day left of Prednisone.  Now wheezing and coughing again.  Sputum remains green.  Was feeling worse but now wheezing more.

## 2024-11-22 NOTE — TELEPHONE ENCOUNTER
Patient has a hosp f/u scheduled with Dr. Victoria on 12/3 but is feeling worse each day. She is wondering what she should do.

## 2024-12-03 ENCOUNTER — OFFICE VISIT (OUTPATIENT)
Age: 39
End: 2024-12-03
Payer: MEDICAID

## 2024-12-03 VITALS
HEART RATE: 79 BPM | HEIGHT: 62 IN | OXYGEN SATURATION: 98 % | WEIGHT: 176 LBS | RESPIRATION RATE: 16 BRPM | BODY MASS INDEX: 32.39 KG/M2

## 2024-12-03 DIAGNOSIS — J45.50 SEVERE PERSISTENT ASTHMA WITHOUT COMPLICATION (HCC): Primary | ICD-10-CM

## 2024-12-03 PROCEDURE — 99214 OFFICE O/P EST MOD 30 MIN: CPT | Performed by: INTERNAL MEDICINE

## 2024-12-03 RX ORDER — PREDNISONE 10 MG/1
TABLET ORAL
Qty: 30 TABLET | Refills: 0 | Status: SHIPPED | OUTPATIENT
Start: 2024-12-03
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
## 2024-12-11 ENCOUNTER — TELEPHONE (OUTPATIENT)
Facility: CLINIC | Age: 39
End: 2024-12-11

## 2024-12-30 ENCOUNTER — TELEPHONE (OUTPATIENT)
Facility: CLINIC | Age: 39
End: 2024-12-30

## 2024-12-30 RX ORDER — TEZEPELUMAB-EKKO 210 MG/1.9ML
1.91 INJECTION, SOLUTION SUBCUTANEOUS
Qty: 1.19 ML | Refills: 5 | Status: SHIPPED | OUTPATIENT
Start: 2024-12-30

## 2024-12-30 NOTE — TELEPHONE ENCOUNTER
Patient received a call that Tezspire appeal was approved.  Per Dr. Esme Herrera 210 mg pen monthly.  Script sent to [facility].  Patient instructed to call once they receive their medication to make an appointment for their first shot.

## 2025-01-20 ENCOUNTER — MED REC SCAN ONLY (OUTPATIENT)
Facility: CLINIC | Age: 40
End: 2025-01-20

## 2025-02-04 ENCOUNTER — TELEPHONE (OUTPATIENT)
Facility: CLINIC | Age: 40
End: 2025-02-04

## 2025-02-04 RX ORDER — PREDNISONE 10 MG/1
TABLET ORAL
Qty: 30 TABLET | Refills: 0 | Status: SHIPPED | OUTPATIENT
Start: 2025-02-04

## 2025-02-04 RX ORDER — PREDNISONE 10 MG/1
TABLET ORAL
Qty: 30 TABLET | Refills: 0 | Status: SHIPPED | OUTPATIENT
Start: 2025-02-04 | End: 2025-02-04

## 2025-02-04 NOTE — TELEPHONE ENCOUNTER
Called and spoke to patient  Daughter sick  Reports increase SOB, wheezing   Denies fevers, chills, fatigue    Using:   Asmanex  Spiriva  Symbicort  And using albuterol more frequent    Waiting on tezspire PA    Plan: prednisone taper  If symptoms worsen go to the ER  And call office to obtain RVP    Patient verbalized understanding

## 2025-02-04 NOTE — TELEPHONE ENCOUNTER
Call made to [facility] to check on status of shipment of Tezspire.  Medication was not shipped due to not having prior authorization.  Upon clarification they were running claim under the medical benefit.  Gave Prior authorization information to technician.  They will escalate the prescription.     Waking up every morning wheezing and coughing more. Symptoms for the past week.  No URI symptoms. Using albuterol.2-3 times a day and waking up at night with difficulty breathing. Wheezing on and off all day. Shortness of breath noted with walking.

## 2025-02-04 NOTE — TELEPHONE ENCOUNTER
Patient was told Tezspire was delivered to our office but she has not heard from us.     She has been having a hard time this winter and has been needing to use her inhaler more often than usual.

## 2025-02-10 ENCOUNTER — TELEPHONE (OUTPATIENT)
Facility: CLINIC | Age: 40
End: 2025-02-10

## 2025-02-10 ENCOUNTER — HOSPITAL ENCOUNTER (OUTPATIENT)
Dept: GENERAL RADIOLOGY | Age: 40
Discharge: HOME | End: 2025-02-10
Payer: MEDICAID

## 2025-02-10 DIAGNOSIS — R05.9 COUGH, UNSPECIFIED TYPE: Primary | ICD-10-CM

## 2025-02-10 DIAGNOSIS — R05.9 COUGH, UNSPECIFIED TYPE: ICD-10-CM

## 2025-02-10 PROCEDURE — 71046 X-RAY EXAM CHEST 2 VIEWS: CPT

## 2025-02-11 ENCOUNTER — TELEPHONE (OUTPATIENT)
Facility: CLINIC | Age: 40
End: 2025-02-11

## 2025-02-11 ENCOUNTER — LAB ENCOUNTER (OUTPATIENT)
Dept: LAB | Age: 40
End: 2025-02-11
Payer: MEDICAID

## 2025-02-11 ENCOUNTER — HOSPITAL ENCOUNTER (OUTPATIENT)
Age: 40
Discharge: HOME | End: 2025-02-11
Payer: MEDICAID

## 2025-02-11 DIAGNOSIS — R05.9 COUGH, UNSPECIFIED TYPE: ICD-10-CM

## 2025-02-11 PROCEDURE — 87637 SARSCOV2&INF A&B&RSV AMP PRB: CPT

## 2025-02-12 LAB
FLUAV + FLUBV RNA SPEC NAA+PROBE: NOT DETECTED
FLUAV + FLUBV RNA SPEC NAA+PROBE: NOT DETECTED
RSV RNA SPEC NAA+PROBE: NOT DETECTED
SARS-COV-2 RNA RESP QL NAA+PROBE: NOT DETECTED

## 2025-02-13 NOTE — TELEPHONE ENCOUNTER
Called and spoke to patient  RVP negative  CXR clean    Feels better    Reports   Productive cough- yellow/brown  Ear pain    Denies  Fever   Chills    Finishing prednisone  Wheezing better, not using albuterol as often    If symptoms worsen call office  Patient verbalized understanding     Will check on tezspire

## 2025-02-13 NOTE — TELEPHONE ENCOUNTER
Call made to [facility] pharmacy.  The script is still under pharmacy review. Talked with a supervisor and case was escalated to set up shipment

## 2025-02-24 ENCOUNTER — TELEPHONE (OUTPATIENT)
Facility: CLINIC | Age: 40
End: 2025-02-24

## 2025-02-27 ENCOUNTER — NURSE ONLY (OUTPATIENT)
Facility: CLINIC | Age: 40
End: 2025-02-27
Payer: MEDICAID

## 2025-02-27 VITALS
OXYGEN SATURATION: 97 % | SYSTOLIC BLOOD PRESSURE: 110 MMHG | DIASTOLIC BLOOD PRESSURE: 78 MMHG | RESPIRATION RATE: 14 BRPM | HEART RATE: 102 BPM

## 2025-02-27 DIAGNOSIS — J45.50 SEVERE PERSISTENT ASTHMA WITHOUT COMPLICATION (HCC): Primary | ICD-10-CM

## 2025-02-27 PROCEDURE — 96372 THER/PROPH/DIAG INJ SC/IM: CPT | Performed by: INTERNAL MEDICINE

## 2025-02-27 RX ORDER — EPINEPHRINE 0.3 MG/.3ML
0.3 INJECTION SUBCUTANEOUS AS NEEDED
Qty: 1 EACH | Refills: 1 | Status: SHIPPED | OUTPATIENT
Start: 2025-02-27 | End: 2026-02-27

## 2025-02-27 NOTE — PROGRESS NOTES
Patient here for her first Tezspire shot.  Per Dr. Victoria patient to receive Tezspire 210 mg auto injector subcutaneous every month.     Auto injector administration reviewed with patient and return demo given with  pen.    Shot given by patient with good technique.      Reviewed signs and symptoms of anaphylaxis with patient and demonstration of epi pen usage given with [place] [facility].  Patient verbalized understanding.     Post 30 min no redness or swelling noted to injection site.  Patient to follow up in one month with Dr. Victoria and for next Tezspire shot.

## 2025-03-27 ENCOUNTER — OFFICE VISIT (OUTPATIENT)
Facility: CLINIC | Age: 40
End: 2025-03-27
Payer: MEDICAID

## 2025-03-27 ENCOUNTER — NURSE ONLY (OUTPATIENT)
Facility: CLINIC | Age: 40
End: 2025-03-27
Payer: MEDICAID

## 2025-03-27 VITALS
HEIGHT: 62 IN | RESPIRATION RATE: 16 BRPM | WEIGHT: 178 LBS | BODY MASS INDEX: 32.76 KG/M2 | OXYGEN SATURATION: 98 % | HEART RATE: 58 BPM

## 2025-03-27 VITALS — OXYGEN SATURATION: 98 % | HEART RATE: 58 BPM | RESPIRATION RATE: 16 BRPM

## 2025-03-27 DIAGNOSIS — J45.20 MILD INTERMITTENT ASTHMA, UNSPECIFIED WHETHER COMPLICATED (HCC): ICD-10-CM

## 2025-03-27 DIAGNOSIS — J30.9 ALLERGIC RHINITIS, UNSPECIFIED SEASONALITY, UNSPECIFIED TRIGGER: ICD-10-CM

## 2025-03-27 DIAGNOSIS — R06.02 SHORTNESS OF BREATH: Primary | ICD-10-CM

## 2025-03-27 DIAGNOSIS — J45.50 SEVERE PERSISTENT ASTHMA WITHOUT COMPLICATION (HCC): Primary | ICD-10-CM

## 2025-03-27 PROCEDURE — 99213 OFFICE O/P EST LOW 20 MIN: CPT

## 2025-03-27 NOTE — PATIENT INSTRUCTIONS
Next shot due in 1 month. Around April 24th.  Call speciality pharmacy to deliver your next shot.
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
## 2025-04-05 DIAGNOSIS — J45.50 SEVERE PERSISTENT ASTHMA WITHOUT COMPLICATION (HCC): ICD-10-CM

## 2025-04-07 RX ORDER — BUDESONIDE AND FORMOTEROL FUMARATE DIHYDRATE 160; 4.5 UG/1; UG/1
2 AEROSOL RESPIRATORY (INHALATION) 2 TIMES DAILY
Qty: 10.2 G | Refills: 0 | Status: SHIPPED | OUTPATIENT
Start: 2025-04-07

## 2025-04-07 RX ORDER — TIOTROPIUM BROMIDE INHALATION SPRAY 3.12 UG/1
2 SPRAY, METERED RESPIRATORY (INHALATION) DAILY
Qty: 4 G | Refills: 0 | Status: SHIPPED | OUTPATIENT
Start: 2025-04-07

## 2025-04-07 NOTE — TELEPHONE ENCOUNTER
Received request for:  Symbicort 160-4.5 and Spiriva 2.5 mcg    Patient last seen by: MADELIN Clark on 3-27-25    Has scheduled appointment: 6-27-25    Physician recommendation: Continue Spiriva and Symbicort    Refills for Spiriva and Symbicort sent to pharmacy.

## 2025-04-25 RX ORDER — TEZEPELUMAB-EKKO 210 MG/1.9ML
INJECTION, SOLUTION SUBCUTANEOUS
Qty: 1.91 ML | Refills: 5 | Status: SHIPPED | OUTPATIENT
Start: 2025-04-25

## 2025-04-25 NOTE — TELEPHONE ENCOUNTER
Received request for:Tezspire    Patient last seen by: 3/27/2025    Has scheduled appointment: 06/27/25    Physician recommendation: Continue Tezspire.every 4 weeks. Refill given.

## 2025-05-22 DIAGNOSIS — J45.50 SEVERE PERSISTENT ASTHMA WITHOUT COMPLICATION (HCC): ICD-10-CM

## 2025-05-22 RX ORDER — BUDESONIDE AND FORMOTEROL FUMARATE DIHYDRATE 160; 4.5 UG/1; UG/1
2 AEROSOL RESPIRATORY (INHALATION) 2 TIMES DAILY
Qty: 10.2 G | Refills: 3 | Status: SHIPPED | OUTPATIENT
Start: 2025-05-22

## 2025-05-22 RX ORDER — TIOTROPIUM BROMIDE INHALATION SPRAY 3.12 UG/1
2 SPRAY, METERED RESPIRATORY (INHALATION) DAILY
Qty: 4 G | Refills: 3 | Status: SHIPPED | OUTPATIENT
Start: 2025-05-22

## 2025-05-22 NOTE — TELEPHONE ENCOUNTER
Received request for: Spiriva and Symbicort    Patient last seen by: Carrie YUSUF on 3/27/25    Has scheduled appointment: 6/27/2025    Physician recommendation: Continue spireva and symbicort     Prescription refilled.

## 2025-06-06 ENCOUNTER — TELEPHONE (OUTPATIENT)
Facility: CLINIC | Age: 40
End: 2025-06-06

## 2025-06-06 NOTE — TELEPHONE ENCOUNTER
Prior authorization obtained from [place] for Tezspire auto injector.  Auth # [id number]. Valid from 06/06/25-06/06/26.

## 2025-06-06 NOTE — TELEPHONE ENCOUNTER
Received request for Prior authorization for Tezspire from [facility].  # [id number].  Pending approval.

## 2025-06-12 ENCOUNTER — MED REC SCAN ONLY (OUTPATIENT)
Facility: CLINIC | Age: 40
End: 2025-06-12

## 2025-06-26 ENCOUNTER — TELEPHONE (OUTPATIENT)
Facility: CLINIC | Age: 40
End: 2025-06-26

## 2025-06-26 NOTE — TELEPHONE ENCOUNTER
Returned Patient's call. Patient has been self administering shots at home with out difficulty.  No side effects. She has been breathing better and not having any daily symptoms or exacerbations.  Patient instructed to keep follow up appointment and no need to bring her Tezspire.  Patient verbalized understanding.

## 2025-06-27 ENCOUNTER — OFFICE VISIT (OUTPATIENT)
Facility: CLINIC | Age: 40
End: 2025-06-27
Payer: MEDICAID

## 2025-06-27 VITALS
OXYGEN SATURATION: 98 % | RESPIRATION RATE: 16 BRPM | BODY MASS INDEX: 33 KG/M2 | HEIGHT: 62 IN | DIASTOLIC BLOOD PRESSURE: 70 MMHG | SYSTOLIC BLOOD PRESSURE: 100 MMHG | HEART RATE: 77 BPM

## 2025-06-27 DIAGNOSIS — J45.40 MODERATE PERSISTENT ASTHMA, UNSPECIFIED WHETHER COMPLICATED (HCC): ICD-10-CM

## 2025-06-27 DIAGNOSIS — J30.9 ALLERGIC RHINITIS, UNSPECIFIED SEASONALITY, UNSPECIFIED TRIGGER: Primary | ICD-10-CM

## 2025-06-27 DIAGNOSIS — R06.02 SHORTNESS OF BREATH: ICD-10-CM

## 2025-06-27 PROCEDURE — 99213 OFFICE O/P EST LOW 20 MIN: CPT

## 2025-06-27 RX ORDER — CHLORAL HYDRATE 500 MG
CAPSULE ORAL DAILY
COMMUNITY

## 2025-06-27 RX ORDER — CALCIUM CARBONATE 300MG(750)
400 TABLET,CHEWABLE ORAL DAILY
COMMUNITY

## 2025-06-27 RX ORDER — PRAZOSIN HYDROCHLORIDE 5 MG/1
5 CAPSULE ORAL NIGHTLY
COMMUNITY
Start: 2025-01-31

## 2025-06-27 RX ORDER — FREMANEZUMAB-VFRM 225 MG/1.5ML
1.5 INJECTION SUBCUTANEOUS
COMMUNITY

## 2025-06-27 RX ORDER — BUSPIRONE HYDROCHLORIDE 30 MG/1
30 TABLET ORAL 2 TIMES DAILY
COMMUNITY
Start: 2025-06-18

## 2025-06-27 RX ORDER — HYDROXYZINE HYDROCHLORIDE 25 MG/1
25 TABLET, FILM COATED ORAL 3 TIMES DAILY PRN
COMMUNITY

## 2025-06-27 RX ORDER — RIZATRIPTAN BENZOATE 10 MG/1
TABLET, ORALLY DISINTEGRATING ORAL
COMMUNITY

## 2025-06-27 RX ORDER — LEVOTHYROXINE SODIUM 125 UG/1
125 TABLET ORAL
COMMUNITY
Start: 2025-04-14
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
## 2025-06-27 NOTE — PATIENT INSTRUCTIONS
Call office with any questions or concerns  Call office if develop any new or worsening respiratory symptoms.   Call office if your inhalers are more than $50 after co-pay  Continue to use  inhalers and tezspire

## 2025-06-30 RX ORDER — MOMETASONE FUROATE 110 UG/1
1 INHALANT RESPIRATORY (INHALATION) DAILY
Qty: 1 EACH | Refills: 0 | Status: SHIPPED | OUTPATIENT
Start: 2025-06-30

## 2025-06-30 NOTE — TELEPHONE ENCOUNTER
Received request for: Asmanex    Patient last seen by: MAYNOR Clark 06/27/25    Has scheduled appointment: 10/27/25    Physician recommendation: Continue Same inhalers.

## 2025-08-14 ENCOUNTER — OFFICE VISIT (OUTPATIENT)
Dept: PODIATRY CLINIC | Facility: CLINIC | Age: 40
End: 2025-08-14

## 2025-08-14 DIAGNOSIS — M77.42 METATARSALGIA OF LEFT FOOT: ICD-10-CM

## 2025-08-14 DIAGNOSIS — M25.572 ARTHRALGIA OF BOTH ANKLES: Primary | ICD-10-CM

## 2025-08-14 DIAGNOSIS — S92.335A CLOSED NONDISPLACED FRACTURE OF THIRD METATARSAL BONE OF LEFT FOOT, INITIAL ENCOUNTER: ICD-10-CM

## 2025-08-14 DIAGNOSIS — M25.571 ARTHRALGIA OF BOTH ANKLES: Primary | ICD-10-CM

## 2025-08-14 RX ORDER — AMOXICILLIN 500 MG/1
CAPSULE ORAL
COMMUNITY
Start: 2025-04-02

## (undated) NOTE — LETTER
12/11/2024      Re: Jaqueline Veloz  06/02/1985  Member # : [id number]  Tracking Number: [id number]          To Whom It May Concern,    I am writing this letter in response to a denial for the coverage of Tezspire auto injector 210 mg/1.91ml. Jaqueline is under my care for the treatment of her severe persistent asthma which she has had since 7th Grade. She is currently on  Symbicort, Spiriva, Montelukast, Albuterol, and Asmanex. She has been on prednisone 2 times this past year,   July 20243, February 2024, and November 2024.  She continues to have daily symptoms of coughing and chest tightness.  She is on maximum therapy and without the addition of Tezspire I will have to send her to the ER or Urgent care for I have no other treatment available to treat her asthma.     ***    Sincerely,    Beth Victoria MD          Document electronically generated by:  Kirstin SPRAGUE RN

## (undated) NOTE — LETTER
12/12/2024      Re: Jaqueline Veloz  06/02/1985  Member #: [id number]  Tracking Number: [id number]          To Whom It May Concern,    I am writing this letter in response to a denial for the coverage of Tezspire auto injector 210 mg/1.91ml. Jaqueline is under my care for the treatment of her severe persistent asthma which she has had since 7th Grade. She has a history of seasonal allergies and an elevated EOS count of 290.  She is currently on Symbicort, Spiriva, Montelukast, Albuterol, and Asmanex. She has been on prednisone 3 times this past year, July 2023, February 2024, and November 2024. She continues to have daily symptoms of coughing and chest tightness. I believe Tezspire is the best option  because it blocks several pathways-allergic, eosinophilic, and TSLP.   She is on maximum therapy and without the addition of Tezspire I will have to send her to the ER or Urgent care for I have no other treatment available to treat her asthma.     Thank you for your consideration for the appeal of coverage of Tezspire autoinjector.       Sincerely,        Beth Victoria MD          Document electronically generated by:  Kirstin SPRAGUE RN